# Patient Record
Sex: FEMALE | Race: WHITE | Employment: UNEMPLOYED | URBAN - METROPOLITAN AREA
[De-identification: names, ages, dates, MRNs, and addresses within clinical notes are randomized per-mention and may not be internally consistent; named-entity substitution may affect disease eponyms.]

---

## 2021-09-20 ENCOUNTER — TRANSCRIBE ORDER (OUTPATIENT)
Dept: SCHEDULING | Age: 31
End: 2021-09-20

## 2021-09-20 DIAGNOSIS — M54.2 CERVICALGIA: Primary | ICD-10-CM

## 2021-09-30 ENCOUNTER — HOSPITAL ENCOUNTER (OUTPATIENT)
Dept: CT IMAGING | Age: 31
Discharge: HOME | End: 2021-09-30
Attending: ORTHOPAEDIC SURGERY
Payer: MEDICAID

## 2021-09-30 DIAGNOSIS — M54.2 CERVICALGIA: ICD-10-CM

## 2021-09-30 PROCEDURE — 72125 CT NECK SPINE W/O DYE: CPT

## 2021-10-14 ENCOUNTER — TRANSCRIBE ORDER (OUTPATIENT)
Dept: REGISTRATION | Age: 31
End: 2021-10-14

## 2021-10-14 ENCOUNTER — HOSPITAL ENCOUNTER (OUTPATIENT)
Dept: LAB | Age: 31
Discharge: HOME | End: 2021-10-14

## 2021-10-14 ENCOUNTER — HOSPITAL ENCOUNTER (OUTPATIENT)
Dept: PREADMISSION TESTING | Age: 31
Discharge: HOME | End: 2021-10-14
Payer: MEDICAID

## 2021-10-14 DIAGNOSIS — M54.2 CERVICALGIA: ICD-10-CM

## 2021-10-14 DIAGNOSIS — M54.2 CERVICALGIA: Primary | ICD-10-CM

## 2021-10-14 DIAGNOSIS — M47.22 CERVICAL SPONDYLOSIS WITH RADICULOPATHY: ICD-10-CM

## 2021-10-14 DIAGNOSIS — M50.322 DEGENERATION OF C5-C6 INTERVERTEBRAL DISC: ICD-10-CM

## 2021-10-14 DIAGNOSIS — Z01.812 BLOOD TESTS PRIOR TO TREATMENT OR PROCEDURE: ICD-10-CM

## 2021-10-14 LAB — XX-LABCORP SPECIMEN COL,LCBCF: NORMAL

## 2021-10-14 PROCEDURE — 93005 ELECTROCARDIOGRAM TRACING: CPT

## 2021-10-14 PROCEDURE — 99001 SPECIMEN HANDLING PT-LAB: CPT

## 2021-10-15 LAB
ATRIAL RATE: 80 BPM
CALCULATED P AXIS, ECG09: 52 DEGREES
CALCULATED R AXIS, ECG10: 30 DEGREES
CALCULATED T AXIS, ECG11: 60 DEGREES
DIAGNOSIS, 93000: NORMAL
P-R INTERVAL, ECG05: 134 MS
Q-T INTERVAL, ECG07: 372 MS
QRS DURATION, ECG06: 88 MS
QTC CALCULATION (BEZET), ECG08: 429 MS
VENTRICULAR RATE, ECG03: 80 BPM

## 2021-10-21 ENCOUNTER — HOSPITAL ENCOUNTER (OUTPATIENT)
Dept: PREADMISSION TESTING | Age: 31
Discharge: HOME | End: 2021-10-21

## 2021-10-21 VITALS — WEIGHT: 232 LBS | BODY MASS INDEX: 41.11 KG/M2 | HEIGHT: 63 IN

## 2021-10-21 RX ORDER — CLINDAMYCIN PHOSPHATE 900 MG/50ML
900 INJECTION, SOLUTION INTRAVENOUS ONCE
Status: CANCELLED | OUTPATIENT
Start: 2021-10-21 | End: 2021-10-21

## 2021-10-21 RX ORDER — CHOLECALCIFEROL TAB 125 MCG (5000 UNIT) 125 MCG
5000 TAB ORAL DAILY
COMMUNITY

## 2021-10-21 RX ORDER — SODIUM CHLORIDE, SODIUM LACTATE, POTASSIUM CHLORIDE, CALCIUM CHLORIDE 600; 310; 30; 20 MG/100ML; MG/100ML; MG/100ML; MG/100ML
125 INJECTION, SOLUTION INTRAVENOUS CONTINUOUS
Status: CANCELLED | OUTPATIENT
Start: 2021-10-21

## 2021-10-21 RX ORDER — BUPROPION HYDROCHLORIDE 200 MG/1
200 TABLET, EXTENDED RELEASE ORAL 2 TIMES DAILY
COMMUNITY

## 2021-10-21 NOTE — PERIOP NOTES
Operative consent filled out according to MD order on surgical posting, verbatim. During PAT interview, patient advised to self quarantine for 7 days prior to DOS. Patient instructed to come in for Covid19 testing from 9-11 am on 10/28/2021 prior to surgery.

## 2021-10-21 NOTE — PERIOP NOTES
Patient instructed to not bring any valuables on DOS including Pocketbook, wallet, jewelry, cell phone, lap top computer or tablet. Patient instructed not to wear make up, powders, deodorant, creams, or lotions on DOS. Patient confirmed receipt of [facility] skin preparation and instructions. Patient is aware of one visitor policy in surgical pavilion.

## 2021-10-25 PROBLEM — M50.30 DDD (DEGENERATIVE DISC DISEASE), CERVICAL: Status: ACTIVE | Noted: 2021-10-25

## 2021-10-25 PROBLEM — Z98.890 HISTORY OF CERVICAL SPINAL SURGERY: Status: ACTIVE | Noted: 2021-10-25

## 2021-10-25 PROBLEM — M50.20 HNP (HERNIATED NUCLEUS PULPOSUS), CERVICAL: Status: ACTIVE | Noted: 2021-10-25

## 2021-10-28 ENCOUNTER — HOSPITAL ENCOUNTER (OUTPATIENT)
Dept: PREADMISSION TESTING | Age: 31
Discharge: HOME | End: 2021-10-28
Payer: MEDICAID

## 2021-10-28 PROCEDURE — U0003 INFECTIOUS AGENT DETECTION BY NUCLEIC ACID (DNA OR RNA); SEVERE ACUTE RESPIRATORY SYNDROME CORONAVIRUS 2 (SARS-COV-2) (CORONAVIRUS DISEASE [COVID-19]), AMPLIFIED PROBE TECHNIQUE, MAKING USE OF HIGH THROUGHPUT TECHNOLOGIES AS DESCRIBED BY CMS-2020-01-R: HCPCS

## 2021-10-29 LAB — SARS-COV-2, NAA: NOT DETECTED

## 2022-03-18 PROBLEM — Z98.890 HISTORY OF CERVICAL SPINAL SURGERY: Status: ACTIVE | Noted: 2021-10-25

## 2022-03-19 PROBLEM — M50.20 HNP (HERNIATED NUCLEUS PULPOSUS), CERVICAL: Status: ACTIVE | Noted: 2021-10-25

## 2022-03-19 PROBLEM — M50.30 DDD (DEGENERATIVE DISC DISEASE), CERVICAL: Status: ACTIVE | Noted: 2021-10-25

## 2023-02-08 ENCOUNTER — OFFICE VISIT (OUTPATIENT)
Dept: NEUROLOGY | Age: 33
End: 2023-02-08
Payer: MEDICAID

## 2023-02-08 DIAGNOSIS — R25.2 MUSCLE CRAMPING: ICD-10-CM

## 2023-02-08 DIAGNOSIS — R20.0 RIGHT ARM NUMBNESS: ICD-10-CM

## 2023-02-08 DIAGNOSIS — R29.898 RIGHT ARM WEAKNESS: Primary | ICD-10-CM

## 2023-02-08 PROCEDURE — 95910 NRV CNDJ TEST 7-8 STUDIES: CPT | Performed by: PSYCHIATRY & NEUROLOGY

## 2023-02-08 PROCEDURE — 95886 MUSC TEST DONE W/N TEST COMP: CPT | Performed by: PSYCHIATRY & NEUROLOGY

## 2023-02-08 NOTE — PROCEDURES
ELECTRODIAGNOSTIC REPORT      Test Date:  2023    Patient: Janet Piper : 1990 Physician: Dr. Elier Ramos   ID#: [id number] SEX: female Ref. Phys: ELENA Reddy     Patient History / Exam:  The patient is a pleasant 28-year-old female who presents with pain and cramping in her right upper extremity. She does have a history of prior cervical spine surgery. Her examination does reveal 5 out of 5 strength throughout. There is no significant sensory loss. Pain does limit the ability to sustain a contraction. EMG & NCV Findings:    Nerve conduction studies as listed below in the right upper extremity were within reference of normal.    Disposable concentric needle examination of the muscles listed below in the right upper extremity was normal.    Interpretation: This study was normal.  There was no electrodiagnostic evidence upon todays examination suggesting a focal or generalized large fiber neuropathy, myopathy, or cervical radiculopathy. ___________________________  Shireen KRAUS   FAAN        Nerve Conduction Studies  Anti Sensory Summary Table     Stim Site NR Onset (ms) Peak (ms) O-P Amp (µV) Norm Peak (ms) Norm O-P Amp Site1 Site2 Dist (cm) Norm Steven (m/s)   Right Median Anti Sensory (2nd Digit)  34.1°C   Wrist    2.7 3.3 28.3 <4 >11 Wrist 2nd Digit 14.0    Right Radial Anti Sensory (Base 1st Digit)  34.1°C   Wrist    1.5 2.1 46.8 <2.9 >15 Wrist Base 1st Digit 10.0    Right Ulnar Anti Sensory (5th Digit)  34.1°C   Wrist    2.1 2.7 19.1 <4.0 >10 Wrist 5th Digit 14.0      Motor Summary Table     Stim Site NR Onset (ms) Norm Onset (ms) O-P Amp (mV) Norm O-P Amp P-T Amp (mV) Site1 Site2 Dist (cm) Steven (m/s)   Right Median Motor (Abd Poll Brev)  34.1°C   Wrist    3.4 <4.5 5.1 >4.1  Wrist Abd Poll Brev 8.0 24   Elbow    6.8  4.5   Elbow Wrist 21.0 62   Right Ulnar Motor (Abd Dig Minimi)  34.1°C   Wrist    2.6 <3.1 7.0 >7.0  Wrist Abd Dig Minimi 8.0    B Elbow    5.8  6.8   B Elbow Wrist 20.0 63   A Elbow    7.5  6.7   A Elbow B Elbow 10.0 59     Comparison Summary Table     Stim Site NR Peak (ms) P-T Amp (µV) Site1 Site2 Dist (cm) Delta-P (ms)   Right Median/Ulnar Palm Comparison (Wrist)  34.1°C   Median Palm    2.2 52.6 Median Palm Ulnar Palm 8.0 0.4   Ulnar Palm    1.8 17.7           EMG     Side Muscle Nerve Root Ins Act Fibs Psw Recrt Duration Amp Poly Comment   Right Deltoid Axillary C5-6 Nml Nml Nml Nml Nml Nml Nml    Right Triceps Radial C6-7-8 Nml Nml Nml Nml Nml Nml Nml    Right Biceps Musculocut C5-6 Nml Nml Nml Nml Nml Nml Nml    Right PronatorTeres Median C6-7 Nml Nml Nml Nml Nml Nml Nml    Right 1stDorInt Ulnar C8-T1 Nml Nml Nml Nml Nml Nml Nml    Right Upper Cerv Parasp Rami C3,4 Nml Nml Nml Nml Nml Nml Nml      Waveforms:

## 2023-11-28 ENCOUNTER — TRANSCRIBE ORDERS (OUTPATIENT)
Facility: HOSPITAL | Age: 33
End: 2023-11-28

## 2023-11-28 DIAGNOSIS — M54.2 CERVICALGIA: Primary | ICD-10-CM

## 2024-01-12 ENCOUNTER — HOSPITAL ENCOUNTER (OUTPATIENT)
Facility: HOSPITAL | Age: 34
End: 2024-01-12
Attending: ORTHOPAEDIC SURGERY
Payer: MEDICAID

## 2024-01-12 DIAGNOSIS — M54.2 CERVICALGIA: ICD-10-CM

## 2024-01-12 PROCEDURE — 72125 CT NECK SPINE W/O DYE: CPT

## 2024-01-29 ENCOUNTER — HOSPITAL ENCOUNTER (OUTPATIENT)
Facility: HOSPITAL | Age: 34
Discharge: HOME | End: 2024-02-01
Payer: MEDICAID

## 2024-01-29 ENCOUNTER — HOSPITAL ENCOUNTER (OUTPATIENT)
Facility: HOSPITAL | Age: 34
Discharge: HOME | End: 2024-02-01

## 2024-01-29 DIAGNOSIS — M54.12 RADICULOPATHY, CERVICAL: ICD-10-CM

## 2024-01-29 DIAGNOSIS — M50.322 DEGENERATION OF C5-C6 INTERVERTEBRAL DISC: ICD-10-CM

## 2024-01-29 LAB
EKG ATRIAL RATE: 78 BPM
EKG DIAGNOSIS: NORMAL
EKG P AXIS: 42 DEGREES
EKG P-R INTERVAL: 146 MS
EKG Q-T INTERVAL: 376 MS
EKG QRS DURATION: 86 MS
EKG QTC CALCULATION (BAZETT): 428 MS
EKG R AXIS: 9 DEGREES
EKG T AXIS: 32 DEGREES
EKG VENTRICULAR RATE: 78 BPM
LABCORP SPECIMEN COLLECTION: NORMAL

## 2024-01-29 PROCEDURE — 93005 ELECTROCARDIOGRAM TRACING: CPT

## 2025-08-18 ENCOUNTER — TELEPHONE (OUTPATIENT)
Facility: CLINIC | Age: 35
End: 2025-08-18